# Patient Record
Sex: MALE | Race: WHITE | Employment: FULL TIME | URBAN - METROPOLITAN AREA
[De-identification: names, ages, dates, MRNs, and addresses within clinical notes are randomized per-mention and may not be internally consistent; named-entity substitution may affect disease eponyms.]

---
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
## 2020-03-10 ENCOUNTER — HOSPITAL ENCOUNTER (EMERGENCY)
Age: 20
Discharge: HOME | End: 2020-03-10
Attending: EMERGENCY MEDICINE
Payer: COMMERCIAL

## 2020-03-10 ENCOUNTER — APPOINTMENT (OUTPATIENT)
Dept: GENERAL RADIOLOGY | Age: 20
End: 2020-03-10
Payer: COMMERCIAL

## 2020-03-10 VITALS
BODY MASS INDEX: 24.38 KG/M2 | HEART RATE: 49 BPM | RESPIRATION RATE: 16 BRPM | HEIGHT: 72 IN | DIASTOLIC BLOOD PRESSURE: 74 MMHG | OXYGEN SATURATION: 99 % | WEIGHT: 180 LBS | SYSTOLIC BLOOD PRESSURE: 123 MMHG | TEMPERATURE: 98.4 F

## 2020-03-10 LAB
A/G RATIO: 1.8 (ref 1.1–2.2)
ALBUMIN SERPL-MCNC: 4.7 G/DL (ref 3.4–5)
ALP BLD-CCNC: 65 U/L (ref 40–129)
ALT SERPL-CCNC: 29 U/L (ref 10–40)
AMPHETAMINE SCREEN, URINE: ABNORMAL
ANION GAP SERPL CALCULATED.3IONS-SCNC: 11 MMOL/L (ref 3–16)
AST SERPL-CCNC: 47 U/L (ref 15–37)
BARBITURATE SCREEN URINE: ABNORMAL
BASOPHILS ABSOLUTE: 0 K/UL (ref 0–0.2)
BASOPHILS RELATIVE PERCENT: 0.4 %
BENZODIAZEPINE SCREEN, URINE: ABNORMAL
BILIRUB SERPL-MCNC: 0.4 MG/DL (ref 0–1)
BUN BLDV-MCNC: 18 MG/DL (ref 7–20)
CALCIUM SERPL-MCNC: 9.8 MG/DL (ref 8.3–10.6)
CANNABINOID SCREEN URINE: POSITIVE
CHLORIDE BLD-SCNC: 104 MMOL/L (ref 99–110)
CO2: 27 MMOL/L (ref 21–32)
COCAINE METABOLITE SCREEN URINE: ABNORMAL
CREAT SERPL-MCNC: 0.9 MG/DL (ref 0.9–1.3)
EKG ATRIAL RATE: 57 BPM
EKG DIAGNOSIS: NORMAL
EKG P AXIS: 69 DEGREES
EKG P-R INTERVAL: 208 MS
EKG Q-T INTERVAL: 416 MS
EKG QRS DURATION: 104 MS
EKG QTC CALCULATION (BAZETT): 404 MS
EKG R AXIS: 93 DEGREES
EKG T AXIS: 68 DEGREES
EKG VENTRICULAR RATE: 57 BPM
EOSINOPHILS ABSOLUTE: 0.1 K/UL (ref 0–0.6)
EOSINOPHILS RELATIVE PERCENT: 1.2 %
GFR AFRICAN AMERICAN: >60
GFR NON-AFRICAN AMERICAN: >60
GLOBULIN: 2.6 G/DL
GLUCOSE BLD-MCNC: 92 MG/DL (ref 70–99)
HCT VFR BLD CALC: 44.6 % (ref 40.5–52.5)
HEMOGLOBIN: 15.3 G/DL (ref 13.5–17.5)
LYMPHOCYTES ABSOLUTE: 3.4 K/UL (ref 1–5.1)
LYMPHOCYTES RELATIVE PERCENT: 43.4 %
Lab: ABNORMAL
MAGNESIUM: 2.2 MG/DL (ref 1.8–2.4)
MCH RBC QN AUTO: 29.3 PG (ref 26–34)
MCHC RBC AUTO-ENTMCNC: 34.2 G/DL (ref 31–36)
MCV RBC AUTO: 85.5 FL (ref 80–100)
METHADONE SCREEN, URINE: ABNORMAL
MONOCYTES ABSOLUTE: 0.8 K/UL (ref 0–1.3)
MONOCYTES RELATIVE PERCENT: 10.6 %
NEUTROPHILS ABSOLUTE: 3.4 K/UL (ref 1.7–7.7)
NEUTROPHILS RELATIVE PERCENT: 44.4 %
OPIATE SCREEN URINE: ABNORMAL
OXYCODONE URINE: ABNORMAL
PDW BLD-RTO: 12.7 % (ref 12.4–15.4)
PH UA: 5.5
PHENCYCLIDINE SCREEN URINE: ABNORMAL
PLATELET # BLD: 304 K/UL (ref 135–450)
PMV BLD AUTO: 6.5 FL (ref 5–10.5)
POTASSIUM REFLEX MAGNESIUM: 3.5 MMOL/L (ref 3.5–5.1)
PROPOXYPHENE SCREEN: ABNORMAL
RBC # BLD: 5.21 M/UL (ref 4.2–5.9)
SODIUM BLD-SCNC: 142 MMOL/L (ref 136–145)
TOTAL PROTEIN: 7.3 G/DL (ref 6.4–8.2)
TROPONIN: <0.01 NG/ML
WBC # BLD: 7.7 K/UL (ref 4–11)

## 2020-03-10 PROCEDURE — 71046 X-RAY EXAM CHEST 2 VIEWS: CPT

## 2020-03-10 PROCEDURE — 83735 ASSAY OF MAGNESIUM: CPT

## 2020-03-10 PROCEDURE — 80307 DRUG TEST PRSMV CHEM ANLYZR: CPT

## 2020-03-10 PROCEDURE — 99284 EMERGENCY DEPT VISIT MOD MDM: CPT

## 2020-03-10 PROCEDURE — 85025 COMPLETE CBC W/AUTO DIFF WBC: CPT

## 2020-03-10 PROCEDURE — 80053 COMPREHEN METABOLIC PANEL: CPT

## 2020-03-10 PROCEDURE — 93005 ELECTROCARDIOGRAM TRACING: CPT | Performed by: PHYSICIAN ASSISTANT

## 2020-03-10 PROCEDURE — 84484 ASSAY OF TROPONIN QUANT: CPT

## 2020-03-10 PROCEDURE — 2580000003 HC RX 258: Performed by: PHYSICIAN ASSISTANT

## 2020-03-10 PROCEDURE — 93010 ELECTROCARDIOGRAM REPORT: CPT | Performed by: INTERNAL MEDICINE

## 2020-03-10 PROCEDURE — 36415 COLL VENOUS BLD VENIPUNCTURE: CPT

## 2020-03-10 RX ORDER — 0.9 % SODIUM CHLORIDE 0.9 %
1000 INTRAVENOUS SOLUTION INTRAVENOUS ONCE
Status: COMPLETED | OUTPATIENT
Start: 2020-03-10 | End: 2020-03-10

## 2020-03-10 RX ADMIN — SODIUM CHLORIDE 1000 ML: 9 INJECTION, SOLUTION INTRAVENOUS at 16:02

## 2020-03-10 ASSESSMENT — ENCOUNTER SYMPTOMS
COUGH: 0
CHEST TIGHTNESS: 1
ABDOMINAL PAIN: 0
VOMITING: 0

## 2020-03-10 ASSESSMENT — PAIN DESCRIPTION - PAIN TYPE: TYPE: ACUTE PAIN

## 2020-03-10 ASSESSMENT — PAIN DESCRIPTION - FREQUENCY: FREQUENCY: INTERMITTENT

## 2020-03-10 ASSESSMENT — PAIN SCALES - GENERAL
PAINLEVEL_OUTOF10: 7
PAINLEVEL_OUTOF10: 0
PAINLEVEL_OUTOF10: 0

## 2020-03-10 ASSESSMENT — PAIN DESCRIPTION - DESCRIPTORS: DESCRIPTORS: TIGHTNESS

## 2020-03-10 ASSESSMENT — PAIN DESCRIPTION - LOCATION: LOCATION: CHEST

## 2020-03-10 NOTE — ED PROVIDER NOTES
[facility]        Pt Name: Olivia Penaloza  MRN: [id number]  [place] 2000  Date of evaluation: 3/10/2020  Provider: Carroll Thakkar PA-C  PCP: Flako Curry    Shared Visit or Autonomous Visit:  I have seen and evaluated this patient with my supervising physician Eric Martin MD.    [address] [place] [address]       Chief Complaint   Patient presents with    Irregular Heart Beat     feels like heart is skipping a beat. has been going on for a few months on and off. HISTORY OF PRESENT ILLNESS   (Location/Symptom, Timing/Onset, Context/Setting, Quality, Duration, Modifying Factors, Severity)  Note limiting factors. Olivia Penaloza is a 20 y.o. male presenting to the ER for evaluation of palpitations for the past 4 months coming and going more frequent for the past 2 weeks and having intermittent left-sided chest tightness/cramp for 2 weeks comes and goes lasts minutes at a time not brought on by anything. States he had been drinking quite a bit of caffeine he stopped this but states palpitations have gotten worse since then and sometimes feels heartbeat and throbbing in his throat. 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 No chest pain with exertion. Pain comes on randomly. States seems to get heart fluttering later on in the day after working. He works in a [employer]. stands on his feet all day. States he has gotten episodes of lightheadedness when he stands for years. He had evaluation with cardiology . History of 1st degree AV block. States he has not been to the doctor for several years. Family history of heart disease in grandfather in his 46s and  at age 64. No family history of heart disease at a young age or sudden death at a young age. Patient vapes. No tobacco use. History of marijuana use. Denies IV drug use. The history is provided by the patient and a parent. Palpitations   Palpitations quality:  Irregular  Onset quality: At rest  Duration: 4 months. Timing:  Intermittent  Progression:  Worsening  Chronicity:  New  Associated symptoms: no chest pain, no cough, no lower extremity edema, no syncope and no vomiting    Risk factors: no diabetes mellitus, no heart disease, no hx of PE and no hypercoagulable state        Nursing Notes were reviewed    REVIEW OF SYSTEMS    (2-9 systems for level 4, 10 or more for level 5)     Review of Systems   Constitutional: Negative for chills and fever. HENT: Negative for congestion. Respiratory: Positive for chest tightness. Negative for cough. Cardiovascular: Positive for palpitations. Negative for chest pain, leg swelling and syncope. Gastrointestinal: Negative for abdominal pain and vomiting. Neurological: Positive for light-headedness. All other systems reviewed and are negative. Positives and Pertinent negatives as per HPI. PAST MEDICAL HISTORY     Past Medical History:   Diagnosis Date    Patent pressure equalisation (PE) tubes, bilateral [id number]         SURGICAL HISTORY     Past Surgical History:   Procedure Laterality Date    TONSILLECTOMY  2005         [address]       Discharge Medication List as of 3/10/2020  5:21 PM      CONTINUE these medications which have NOT CHANGED    Details   methylPREDNISolone (MEDROL, JER,) 4 MG tablet TAKE AS INSTRUCTED, Disp-1 kit, R-0Normal      dexamethasone (DECADRON) 4 MG/ML injection 1.3-1.5mL applied transdermally q 24-48 hours as directed by physical therapist, Disp-30 mL, R-0Normal      loratadine (CLARITIN) 10 MG tablet TAKE 1 TABLET DAILY AS NEEDED ALLERGY SYMPTOMS, R-2      ibuprofen (ADVIL;MOTRIN) 600 MG tablet Take 600 mg by mouth every 6 hours as needed.                ALLERGIES     Codeine    FAMILYHISTORY History reviewed. No pertinent family history. SOCIAL HISTORY       Social History     Socioeconomic History    Marital status: Single     Spouse name: None    Number of children: None    Years of education: None    Highest education level: None   Occupational History    None   Social Needs    Financial resource strain: None    Food insecurity     Worry: None     Inability: None    Transportation needs     Medical: None     Non-medical: None   Tobacco Use    Smoking status: Never Smoker    Smokeless tobacco: Never Used   Substance and Sexual Activity    Alcohol use: Yes     Comment: rarely     Drug use: No    Sexual activity: None   Lifestyle    Physical activity     Days per week: None     Minutes per session: None    Stress: None   Relationships    Social connections     Talks on phone: None     Gets together: None     Attends Anabaptism service: None     Active member of club or organization: None     Attends meetings of clubs or organizations: None     Relationship status: None    Intimate partner violence     Fear of current or ex partner: None     Emotionally abused: None     Physically abused: None     Forced sexual activity: None   Other Topics Concern    None   Social History Narrative    None       SCREENINGS             PHYSICAL EXAM    (up to 7 for level 4, 8 or more for level 5)     ED Triage Vitals [03/10/20 1510]   BP Temp Temp src Pulse Resp SpO2 Height Weight   (!) 145/76 98.4 °F (36.9 °C) -- 64 21 99 % 6' (1.829 m) 180 lb (81.6 kg)       Physical Exam  Vitals signs and nursing note reviewed. Constitutional:       Appearance: He is well-developed. He is not toxic-appearing. HENT:      Head: Normocephalic and atraumatic. Right Ear: Tympanic membrane and ear canal normal.      Left Ear: Tympanic membrane and ear canal normal.      Mouth/Throat:      Mouth: Mucous membranes are moist.      Pharynx: Oropharynx is clear. Uvula midline.  No posterior oropharyngeal erythema. Eyes:      Conjunctiva/sclera: Conjunctivae normal.      Pupils: Pupils are equal, round, and reactive to light. Neck:      Musculoskeletal: Normal range of motion and neck supple. Cardiovascular:      Rate and Rhythm: Normal rate and regular rhythm. Pulses: Normal pulses. Radial pulses are 2+ on the right side and 2+ on the left side. Posterior tibial pulses are 2+ on the right side and 2+ on the left side. Heart sounds: Normal heart sounds. Pulmonary:      Effort: Pulmonary effort is normal. No respiratory distress. Breath sounds: Normal breath sounds. No wheezing, rhonchi or rales. Abdominal:      General: Bowel sounds are normal.      Palpations: Abdomen is soft. Abdomen is not rigid. Tenderness: There is no abdominal tenderness. There is no guarding or rebound. Musculoskeletal: Normal range of motion. General: No tenderness. Right lower leg: No edema. Left lower leg: No edema. Skin:     General: Skin is warm and dry. Capillary Refill: Capillary refill takes less than 2 seconds. Neurological:      Mental Status: He is alert and oriented to person, place, and time. GCS: GCS eye subscore is 4. GCS verbal subscore is 5. GCS motor subscore is 6. Cranial Nerves: No cranial nerve deficit. Sensory: No sensory deficit. Motor: No abnormal muscle tone. Coordination: Coordination normal.   Psychiatric:         Speech: Speech normal.         Behavior: Behavior normal.         Thought Content: Thought content normal.         DIAGNOSTIC RESULTS   LABS:    Labs Reviewed   COMPREHENSIVE METABOLIC PANEL W/ REFLEX TO MG FOR LOW K - Abnormal; Notable for the following components:       Result Value    AST 47 (*)     All other components within normal limits    Narrative:     Performed at:  [facility]. [facility]  [address] [place] [address].  [address]   Phone [phone]   URINE DRUG SCREEN - Abnormal; Notable for the following components:    Cannabinoid Scrn, Ur POSITIVE (*)     All other components within normal limits    Narrative:     Performed at:  [facility]. [facility]  [address] [place] [address]. [place] [facility] [place]   Phone [phone]   CBC WITH AUTO DIFFERENTIAL    Narrative:     Performed at:  [facility]. [facility]  [address] [place] [address]. [place] [facility] [place]   Phone [phone]   TROPONIN    Narrative:     Performed at:  [facility]. [facility]  [address] [place] [address]. [place] [facility] [place]   Phone [phone]   MAGNESIUM    Narrative:     Performed at:  [facility]. [facility]  [address] [place] [address].  [place] [facility] [place]   Phone [phone]     Results for orders placed or performed during the hospital encounter of 03/10/20   CBC Auto Differential   Result Value Ref Range    WBC 7.7 4.0 - 11.0 K/uL    RBC 5.21 4.20 - 5.90 M/uL    Hemoglobin 15.3 13.5 - 17.5 g/dL    Hematocrit 44.6 40.5 - 52.5 %    MCV 85.5 80.0 - 100.0 fL    MCH 29.3 26.0 - 34.0 pg    MCHC 34.2 31.0 - 36.0 g/dL    RDW 12.7 12.4 - 15.4 %    Platelets 355 395 - 138 K/uL    MPV 6.5 5.0 - 10.5 fL    Neutrophils % 44.4 %    Lymphocytes % 43.4 %    Monocytes % 10.6 %    Eosinophils % 1.2 %    Basophils % 0.4 %    Neutrophils Absolute 3.4 1.7 - 7.7 K/uL    Lymphocytes Absolute 3.4 1.0 - 5.1 K/uL    Monocytes Absolute 0.8 0.0 - 1.3 K/uL    Eosinophils Absolute 0.1 0.0 - 0.6 K/uL    Basophils Absolute 0.0 0.0 - 0.2 K/uL   Comprehensive Metabolic Panel w/ Reflex to MG   Result Value Ref Range    Sodium 142 136 - 145 mmol/L    Potassium reflex Magnesium 3.5 3.5 - 5.1 mmol/L    Chloride 104 99 - 110 mmol/L    CO2 27 21 - 32 mmol/L    Anion Gap 11 3 - 16    Glucose 92 70 - 99 mg/dL    BUN 18 7 - 20 mg/dL    CREATININE 0.9 0.9 - 1.3 mg/dL    GFR Non-African American >60 >60    GFR  >60 mis-transcribed.)    Jeremiah Gordon PA-C (electronically signed)            Katalina Palma PA-C  03/10/20 1800

## 2020-04-17 ENCOUNTER — OFFICE VISIT (OUTPATIENT)
Dept: CARDIOLOGY CLINIC | Age: 20
End: 2020-04-17
Payer: COMMERCIAL

## 2020-04-17 VITALS
SYSTOLIC BLOOD PRESSURE: 100 MMHG | HEART RATE: 68 BPM | HEIGHT: 73 IN | DIASTOLIC BLOOD PRESSURE: 60 MMHG | BODY MASS INDEX: 23.06 KG/M2 | OXYGEN SATURATION: 98 % | WEIGHT: 174 LBS

## 2020-04-17 PROBLEM — R00.1 SINUS BRADYCARDIA BY ELECTROCARDIOGRAM: Status: ACTIVE | Noted: 2020-04-17

## 2020-04-17 PROBLEM — R07.2 PRECORDIAL PAIN: Status: ACTIVE | Noted: 2020-04-17

## 2020-04-17 PROBLEM — R00.2 PALPITATIONS: Status: ACTIVE | Noted: 2020-04-17

## 2020-04-17 PROBLEM — I44.0 FIRST DEGREE HEART BLOCK: Status: ACTIVE | Noted: 2020-04-17

## 2020-04-17 PROBLEM — R94.31 ABNORMAL EKG: Status: ACTIVE | Noted: 2020-04-17

## 2020-04-17 PROCEDURE — 99244 OFF/OP CNSLTJ NEW/EST MOD 40: CPT | Performed by: INTERNAL MEDICINE

## 2020-04-17 NOTE — PROGRESS NOTES
[address] [facility]   Cardiovascular Evaluation    PATIENT: David Lugo  DATE: 2020  MRN: <[id number]>  CSN: [id number]  : 2000    Primary Care Doctor/Referring provider: Eneida Spann    Reason for evaluation/Chief complaint:   New Patient; Palpitations; Chest Pain; and Shortness of Breath      Subjective:    History of present illness on initial date of evaluation:   David Lugo is a 20 y.o. patient who presents for evaluation of palpitations. He presented to the emergency room on 3/10/2020 with an irregular heart beat. He reported that this had been going on for a minimum of 4 months, worsening in the days prior to his trip to the emergency room. He reported a history of syncope while in high school. After that he was evaluated by cardiology and was noted to have sinus bradycardia as well as 1st degree AV block. 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 Today he reports that he has been experiencing heart palpitations beginning roughly 4-5 months ago. He initially attributed this to excessive caffeine intake. He decreased this and the symptoms progressed to sharp chest pain and left arm pain. The sharp left sided pain has worsened in the last month and generally occurs around the same time every day when he gets off work. 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 He exercises daily with heavy weight lifting and tolerates this well though recently injured his back. He also feels a strong palpitation at the top of his breath. He vaps daily and has for the last 3 years. He does admit to increase stress in the last year. His diet has also worsened in the last year mainly involving fast food. He dropped out of college last year due to depression. Regarding his history of syncope, he has continued to struggle with this.  He describes this as feeling his vision go black and weakness in his legs when

## 2020-04-17 NOTE — LETTER
[address] [facility]   Cardiovascular Evaluation    PATIENT: Marshall Coles  DATE: 2020  MRN: <[id number]>  CSN: [id number]  : 2000    Primary Care Doctor/Referring provider: Humaira Baez    Reason for evaluation/Chief complaint:   New Patient; Palpitations; Chest Pain; and Shortness of Breath      Subjective:    History of present illness on initial date of evaluation:   Marshall Coles is a 20 y.o. patient who presents for evaluation of palpitations. He presented to the emergency room on 3/10/2020 with an irregular heart beat. He reported that this had been going on for a minimum of 4 months, worsening in the days prior to his trip to the emergency room. He reported a history of syncope while in high school. After that he was evaluated by cardiology and was noted to have sinus bradycardia as well as 1st degree AV block. 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 Today he reports that he has been experiencing heart palpitations beginning roughly 4-5 months ago. He initially attributed this to excessive caffeine intake. He decreased this and the symptoms progressed to sharp chest pain and left arm pain. The sharp left sided pain has worsened in the last month and generally occurs around the same time every day when he gets off work. 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 He exercises daily with heavy weight lifting and tolerates this well though recently injured his back. He also feels a strong palpitation at the top of his breath. He vaps daily and has for the last 3 years. He does admit to increase stress in the last year. His diet has also worsened in the last year mainly involving fast food. He dropped out of college last year due to depression. Regarding his history of syncope, he has continued to struggle with this.  He describes this as feeling his vision go black and weakness in his legs when he stands quickly. When this occurs he feels that he is going to black out. This does not occur every day. He describes this as coming in waves every few days. Patient Active Problem List   Diagnosis    Ankle pain    Fracture of lateral malleolus, closed    Tibialis posterior tendinitis    Palpitations    Precordial pain    Sinus bradycardia by electrocardiogram    Abnormal EKG    First degree heart block         Cardiac Testing: I have reviewed the findings below. EKG:  ECHO:   STRESS TEST:  CATH:  BYPASS:  VASCULAR:    Past Medical History:   has a past medical history of Patent pressure equalisation (PE) tubes, bilateral.    Surgical History:   has a past surgical history that includes Tonsillectomy (2005). Social History:   reports that he has been smoking. He has never used smokeless tobacco. He reports current alcohol use. He reports that he does not use drugs. Family History:  No evidence for sudden cardiac death or premature CAD    Medications:  Reviewed and are listed in nursing record. and/or listed below  Outpatient Medications:  Prior to Admission medications    Medication Sig Start Date End Date Taking? Authorizing Provider   ibuprofen (ADVIL;MOTRIN) 600 MG tablet Take 600 mg by mouth every 6 hours as needed. Yes Historical Provider, MD       In-patient schedule medications:        Infusion Medications: Allergies:  Codeine     Review of Systems:   All 14 point review of symptoms completed. Pertinent positives identified in the HPI, all other review of symptoms findings as below.      Review of Systems - History obtained from the patient  General ROS: negative for - chills, fever or night sweats  Psychological ROS: negative for - disorientation or hallucinations  Ophthalmic ROS: negative for - dry eyes, eye pain or loss of vision  ENT ROS: negative for - nasal discharge or sore throat  Allergy and Immunology ROS: negative for - hives or itchy/watery eyes Pulses: 2+ and symmetric   Skin: Skin color, texture, turgor normal, no rashes or lesions   Pysch: Normal mood and affect   Neurologic: Normal gross motor and sensory exam.         Labs  No results for input(s): WBC, HGB, HCT, MCV, PLT in the last 72 hours. No results for input(s): CREATININE, BUN, NA, K, CL, CO2 in the last 72 hours. No results for input(s): INR, PROTIME in the last 72 hours. No results for input(s): TROPONINI in the last 72 hours. Invalid input(s): PRO-BNP  No results for input(s): CHOL, HDL in the last 72 hours. Invalid input(s): LDL, TG      Imaging:  I have reviewed the below testing personally and my interpretation is below. EKG: SB with RSR' 1st degree HB  CXR:      Assessment:  20 y.o. patient with:  Problem List Items Addressed This Visit     Palpitations - Primary    Relevant Orders    Echo 2D w doppler w color complete    Cardiac event monitor    CTA CARDIAC W C STRC MORP W CONTRAST    Precordial pain    Relevant Orders    Echo 2D w doppler w color complete    CTA CARDIAC W C STRC MORP W CONTRAST    Sinus bradycardia by electrocardiogram    Abnormal EKG    First degree heart block          Plan:  1. An echocardiogram will be ordered. This will allow review of the patient's left ventricular function and determine any valve abnormalities. The pericardium and other structures will also be evaluated. 2. 1 week cardiac event monitor. 3. Coronary CTA to rule out coronary anomaly. 4. The patient was seen for >25 minutes. >50% of the time was devoted to giving the patient detailed instructions instructions on addressing diet, regular exercise, weight control, smoking abstention, medication compliance, and stress minimization. The patient was provided written and verbal instructions regarding risk factor modification. 5. Follow up with me after testing. This note was scribed in the presence of Dr. Jorge Rose MD by Xiao eRilly RN. I, Dr. Abel English, personally performed the services described in this documentation, as scribed by the above signed scribe in my presence. It is both accurate and complete to my knowledge. I agree with the details independently gathered by the clinical support staff, while the remaining scribed note accurately describes my personal service to the patient. All questions and concerns were addressed to the patient/family. Alternatives to my treatment were discussed. The note was completed using EMR. Every effort was made to ensure accuracy; however, inadvertent computerized transcription errors may be present.     Abel English MD, [address], [place], [place]  [phone] [facility]  [phone] Main central  4/17/2020  3:20 PM

## 2020-04-20 ENCOUNTER — TELEPHONE (OUTPATIENT)
Dept: CARDIOLOGY CLINIC | Age: 20
End: 2020-04-20

## 2020-04-20 NOTE — TELEPHONE ENCOUNTER
PT mom called and stated that she call and Central scheduling, cant get testing done until after 5/26, per Chuyita Martin needs to get in sooner. Please call and get echo and CTA scheduled. If any questions call Ruben Molina.

## 2020-04-29 ENCOUNTER — HOSPITAL ENCOUNTER (OUTPATIENT)
Dept: CT IMAGING | Age: 20
Discharge: HOME | End: 2020-04-29
Payer: COMMERCIAL

## 2020-04-29 ENCOUNTER — TELEPHONE (OUTPATIENT)
Dept: CARDIOLOGY CLINIC | Age: 20
End: 2020-04-29

## 2020-04-29 PROCEDURE — 75574 CT ANGIO HRT W/3D IMAGE: CPT

## 2020-04-29 PROCEDURE — 6360000004 HC RX CONTRAST MEDICATION: Performed by: INTERNAL MEDICINE

## 2020-04-29 RX ADMIN — IOPAMIDOL 110 ML: 755 INJECTION, SOLUTION INTRAVENOUS at 10:09

## 2020-05-01 ENCOUNTER — TELEPHONE (OUTPATIENT)
Dept: CARDIOLOGY CLINIC | Age: 20
End: 2020-05-01

## 2020-05-01 ENCOUNTER — HOSPITAL ENCOUNTER (OUTPATIENT)
Dept: NON INVASIVE DIAGNOSTICS | Age: 20
Discharge: HOME | End: 2020-05-01
Payer: COMMERCIAL

## 2020-05-01 LAB
LV EF: 55 %
LVEF MODALITY: NORMAL

## 2020-05-01 PROCEDURE — 93306 TTE W/DOPPLER COMPLETE: CPT

## 2020-05-01 NOTE — TELEPHONE ENCOUNTER
----- Message from Jono Nickerson MD sent at 5/1/2020  1:40 PM EDT -----  The test is normal. Please call the patient and inform them of the normal result.

## 2020-05-27 PROCEDURE — 93268 ECG RECORD/REVIEW: CPT | Performed by: INTERNAL MEDICINE

## 2020-06-02 ENCOUNTER — TELEPHONE (OUTPATIENT)
Dept: CARDIOLOGY CLINIC | Age: 20
End: 2020-06-02

## 2020-06-02 NOTE — TELEPHONE ENCOUNTER
Pt wore monitor d/t palpitations. Monitor showed NSVT, symptomatic bradycardia and 3.2 second pause.

## 2020-06-05 NOTE — TELEPHONE ENCOUNTER
Spoke with pts mother regarding results. She will relay them to the pt. Please call pts mom to make EP apt. Next available is fine. [phone].

## 2020-07-10 ENCOUNTER — HOSPITAL ENCOUNTER (OUTPATIENT)
Dept: GENERAL RADIOLOGY | Age: 20
Discharge: HOME | End: 2020-07-10
Payer: COMMERCIAL

## 2020-07-10 ENCOUNTER — HOSPITAL ENCOUNTER (OUTPATIENT)
Age: 20
Discharge: HOME | End: 2020-07-10
Payer: COMMERCIAL

## 2020-07-10 PROCEDURE — 72040 X-RAY EXAM NECK SPINE 2-3 VW: CPT

## 2020-07-10 PROCEDURE — 72100 X-RAY EXAM L-S SPINE 2/3 VWS: CPT

## 2021-03-03 ENCOUNTER — HOSPITAL ENCOUNTER (OUTPATIENT)
Dept: NEUROLOGY | Age: 21
Discharge: HOME | End: 2021-03-03
Payer: COMMERCIAL

## 2021-03-03 PROCEDURE — 95910 NRV CNDJ TEST 7-8 STUDIES: CPT

## 2021-03-03 PROCEDURE — 95886 MUSC TEST DONE W/N TEST COMP: CPT

## 2021-03-03 NOTE — PROCEDURES
Electrodiagnostic Report  [facility]  Physical Medicine & Rehabilitation    03/03/21    Veronica Lind Willian  23 y.o.  2000  [id number]  Referring Provider: Lashawn ESPINOSA    Clinical Problem:  23 y.o with history of paresthsias of both arms . Onset one year ago. PMH: no endocrine disease. No neck or arm surgery PE: reflexes trace, normal strength    EMG SUMMARY TABLE RIGHT/LEFT UPPER     Spontaneous     MUAP   Recruitment    Insertional Activity Fibrillation Potential Positive Sharp Waves   Fasiculation High Frequency Potentials   Amp Duration PPP Pattern   Deltoid C5.6 Ax normal none none none none normal normal normal normal   Biceps C5,6 musc cut normal none none none none normal normal normal normal   Triceps C6,7,8 Radial normal none none none none normal normal normal normal   Pronator Teres C6,7 Median normal none none none none normal normal normal normal   Brachio Rad C5,6 Radial normal none none none none normal normal normal normal   Ext Ind Prop C7,8 Radial normal none none none none normal normal normal normal   Oppones Poll C8-T1 Median normal none none none none normal normal normal normal   1st Dorsal Int C8-T1 Ulnar normal none none none none normal normal normal normal   Cerv Paraspinals C2-T1 PPR       normal none none none none normal normal normal normal     Nerve Conduction Studies     Nerve Sensory Distal Latency (msec) Sensory Distal Latency (msec) Amp uv Amp uv Motor Distal Latency (msec) Motor Distal Latency (msec) Amp kv Amp kv Motor NCV (m/sec) Motor NCV (m/sec)    Right Left Right Left Right Left Right Left Right Left   Median 4.1 (n<3.6) 3.9 (n<3.6) 40  21  5.7 (n<4.3) 4.6 (n<4.3) 5.6  5.3 53 (n>50) 57 (n>50)   Ulnar 3.3 (n<3.7) 3.6 (n<3.7) 25  27  3.3 (n<4.2) 3.7 (n<4.2) 10.3 9.8 9.8   9.5 62 b.e(n>50)   43 a.e(>45) 65 b.e(n>50)  43  a.e(>45)   Radial (n<3.5) (n<3.5)             Summary of EMG and Nerve Conduction Findings:   The above EMG needle exam was within normal limits. Nerve conduction studies demonstrate prolongation of both median sensory and motor distal latencies. There is slowing of ulnar motor conduction velocity across elbows. 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 There is underlying bilateral carpal tunnel syndrome, right moderate , left mild severity. No evidence of an acute radiculopathy or other lower motor neuron dysfunction. Thank you. Electronically signed by:  Celestine Cardona DO,3/3/2021,8:35 AM

## 2021-03-23 ENCOUNTER — OFFICE VISIT (OUTPATIENT)
Dept: ORTHOPEDIC SURGERY | Age: 21
End: 2021-03-23
Payer: COMMERCIAL

## 2021-03-23 VITALS — WEIGHT: 174 LBS | BODY MASS INDEX: 23.06 KG/M2 | HEIGHT: 73 IN

## 2021-03-23 DIAGNOSIS — F17.200 CURRENT SMOKER: ICD-10-CM

## 2021-03-23 DIAGNOSIS — M25.532 BILATERAL WRIST PAIN: ICD-10-CM

## 2021-03-23 DIAGNOSIS — M25.531 BILATERAL WRIST PAIN: ICD-10-CM

## 2021-03-23 DIAGNOSIS — G56.03 BILATERAL CARPAL TUNNEL SYNDROME: Primary | ICD-10-CM

## 2021-03-23 PROCEDURE — 99406 BEHAV CHNG SMOKING 3-10 MIN: CPT | Performed by: ORTHOPAEDIC SURGERY

## 2021-03-23 PROCEDURE — 99243 OFF/OP CNSLTJ NEW/EST LOW 30: CPT | Performed by: ORTHOPAEDIC SURGERY

## 2021-03-23 PROCEDURE — L3908 WHO COCK-UP NONMOLDE PRE OTS: HCPCS | Performed by: ORTHOPAEDIC SURGERY

## 2021-03-29 PROBLEM — F17.200 CURRENT SMOKER: Status: ACTIVE | Noted: 2021-03-29

## 2021-03-29 PROBLEM — G56.03 BILATERAL CARPAL TUNNEL SYNDROME: Status: ACTIVE | Noted: 2021-03-29

## 2021-03-29 NOTE — PROGRESS NOTES
current or ex partner: Not on file     Emotionally abused: Not on file     Physically abused: Not on file     Forced sexual activity: Not on file   Other Topics Concern    Not on file   Social History Narrative    Not on file       History reviewed. No pertinent family history. Current Outpatient Medications on File Prior to Visit   Medication Sig Dispense Refill    ibuprofen (ADVIL;MOTRIN) 600 MG tablet Take 600 mg by mouth every 6 hours as needed. No current facility-administered medications on file prior to visit. Pertinent items are noted in HPI  Review of systems reviewed from Patient History Form dated on 3/23/2021 and available in the patient's chart under the Media tab. No change noted. PHYSICAL EXAMINATION:  Mr. Reuben Laws is a very pleasant 23 y.o.  male who presents today in no acute distress, awake, alert, and oriented. He is well dressed, nourished and  groomed. Patient with normal affect. Height is  6' 1\" (1.854 m), weight is 174 lb (78.9 kg), Body mass index is 22.96 kg/m². Resting respiratory rate is 16. Examination of the gait, showed that the patient walks with No limp . Examination of both Upper extremities showing a normal range of motion of the right hand compare to the other side. There is no swelling that can be seen. Examination for Carpal Tunnel Syndrome shows Carpal Tunnel Compression Test to be moderately positive on the right & mildly positive on the left. Phalen's Maneuver is moderately positive on the right & mildly positive on the left. The patient displays mild baseline symptoms to potentially confound the exam.  The thenar musculature is not atrophied & weakened. IMAGING: Margarita Roe were reviewed, taken today in the office, 3 views of the bilateral wrist, and showed no fracture, no other abnormalities.      IMPRESSION: Left and Right wrist Pain with numbness and tingling/ carpal tunnel syndrome      PLAN:  I assured the patient that the xray is negative for acute fracture. The patient can take NSAIDs PRN and recommended wearing the wrist brace at night. F/U in 4 weeks with Dr Mary Clark. The patient smokes vaper, and we discussed with the patient the risks of smoking on general health and also on bone and soft tissue healing (delay and non-union), and promised to cut down or stop smoking. Smoking: Educated the patient regarding the hazards of smoking and that it harms their body in many ways. It increases the chance of developing heart disease, lung disease, cancer, and other health problems including poor bone and wound healing. The importance of smoking cessation for optimal bone and wound healing was stressed. This was communicated verbally, 5 Minutes. Thank you very much for the kind consultation and allowing me to participate in this patient's care. I will continue to keep you apprised of his progress.         Genie Orellana MD